# Patient Record
Sex: FEMALE | Race: OTHER
[De-identification: names, ages, dates, MRNs, and addresses within clinical notes are randomized per-mention and may not be internally consistent; named-entity substitution may affect disease eponyms.]

---

## 2023-01-11 ENCOUNTER — NON-APPOINTMENT (OUTPATIENT)
Age: 43
End: 2023-01-11

## 2023-01-17 ENCOUNTER — TRANSCRIPTION ENCOUNTER (OUTPATIENT)
Age: 43
End: 2023-01-17

## 2023-01-17 ENCOUNTER — APPOINTMENT (OUTPATIENT)
Dept: ORTHOPEDIC SURGERY | Facility: CLINIC | Age: 43
End: 2023-01-17
Payer: OTHER GOVERNMENT

## 2023-01-17 VITALS
WEIGHT: 210 LBS | BODY MASS INDEX: 31.83 KG/M2 | HEART RATE: 88 BPM | TEMPERATURE: 98.4 F | HEIGHT: 68 IN | DIASTOLIC BLOOD PRESSURE: 108 MMHG | OXYGEN SATURATION: 98 % | SYSTOLIC BLOOD PRESSURE: 163 MMHG

## 2023-01-17 DIAGNOSIS — Z83.3 FAMILY HISTORY OF DIABETES MELLITUS: ICD-10-CM

## 2023-01-17 DIAGNOSIS — Z96.651 PRESENCE OF RIGHT ARTIFICIAL KNEE JOINT: ICD-10-CM

## 2023-01-17 DIAGNOSIS — M25.561 PAIN IN RIGHT KNEE: ICD-10-CM

## 2023-01-17 DIAGNOSIS — Z00.00 ENCOUNTER FOR GENERAL ADULT MEDICAL EXAMINATION W/OUT ABNORMAL FINDINGS: ICD-10-CM

## 2023-01-17 PROCEDURE — 73564 X-RAY EXAM KNEE 4 OR MORE: CPT | Mod: RT

## 2023-01-17 PROCEDURE — 99204 OFFICE O/P NEW MOD 45 MIN: CPT

## 2023-01-17 PROCEDURE — 73560 X-RAY EXAM OF KNEE 1 OR 2: CPT | Mod: LT

## 2023-01-17 NOTE — DISCUSSION/SUMMARY
[de-identified] : At this time it is hard to evaluate where the patient's pain is coming from it appears it may be soft tissue at the patella tendon level where she contused her knee but is lasted for a month and there is some effusion in the knee for that reason she will get a [facility] MRI of the right knee to evaluate the soft tissues.  She now knows that her x-rays look good and if her pain resolves over the next several days she says she may not get the MRI but if it continues to hurt she will definitely get it and we will see her after that time.

## 2023-01-17 NOTE — HISTORY OF PRESENT ILLNESS
[de-identified] : Ms. CARLYLE GAMBINO is a 42 year female who presents to office complaining of right knee pain x 1 month.\par Patient had a partial right knee replacement in January 2022 then converted to a total right knee replacement in April 2022. \par These surgeries were done in [place] by a a surgeon there.\par About 1 month ago, she says her right knee got struck by a closing car door.\par She describes a constant throbbing pain localized to the lateral aspect of her knee where the car door hit.\par Pain is aggravated with increased weightbearing and ambulating.\par She reported doing very well with her right TKR until that point.\par She had rested, applied ice, took Advil, and elevated her leg following the injury.\par Denies radiation of pain or distal numbness/tingling.\par All review of systems, family history, social history, surgical history, past medical history, medications, and allergies not previously stated as positive are negative. They were reviewed by me today with the patient and documented accordingly.

## 2023-01-17 NOTE — PHYSICAL EXAM
[de-identified] : Constitutional - the patient is of normal build and overweight with a BMI of 31.9.  The patient remains oriented to person, time, and  place.  Mood is normal. Vital signs as recorded.  The patients gait is WNL. The patient has satisfactory  balance and can stand on toes and heels.\par \par The patient has no difficulty with respiration. Respiration at rest is a normal rate. The patient is not short of breath and has not become short of breath with short ambulation. There is no audible wheezing. No coughing.\par \par Skin is normal for the patient's age. There are no abnormal masses or lymph nodes which stand out in the lower extremities.\par \par Spine - deep tendon reflexes are symmetric. Motor and sensory are symmetric.\par \par \par UPPER EXTREMITIES \par \par Shoulders ROM  is symmetric  and the motion is satisfactory.  There is no significant shoulder pain or limitation in motion which would make using a cane or a walker difficult. Shoulder stability and  strength are satisfactory.\par \par There is normal motion in the wrists and elbows.\par \par Circulation appears satisfactory with pedal pulses present.  There is no major edema in the lower legs. No skin tenderness or increased temperature. No major varicosities.\par \par HIP EXAMINATION the abduction and abduction as well as rotation measurements were taken with the hip in flexion.\par \par Motion\par There is symmetric motion with flexion 135 degrees, abduction 80 degrees, adduction 30 degrees, external rotation 80 degrees, internal rotation 20 degrees.\par \par The hips have good range of motion. There is good strength across the hips. There is no crepitus in either hip. The alignment of the hips is normal.\par \par \par KNEE EXAMINATION\par \par Motion\par Right Knee has 0 to 130 degrees of motion with good medial  lateral and anterior posterior stability.  There is an effusion present which seem to happen after she hit her knee in the car door.  She does not recall if she had the swelling prior  There is no Baker's cyst.  There is no significant patellofemoral crepitus.  She has pain with palpation toward the medial aspect of her knee joint but more directly over the patella tendon where she appeared to have a contusion.  The patient has satisfactory strength across the knee.               \par Left  Knee   has 0 to 135 degrees of motion with good medial lateral and anterior posterior stability.  There is no major effusion there is no Baker's cyst.  There is no significant patellofemoral crepitus.  The patient has satisfactory strength across the knee.            \par \par Ankle and foot examination\par Of the ankle has normal motion.  There is normal ankle stability.  The patient has no major abnormalities of the foot.\par \par \par \par  [de-identified] : 4 views were taken of the patient's right knee with a AP of the left left knee shows normal medial lateral joint space and alignment of the knee no evidence of arthritis by x-ray.  Her right knee does show a cemented total knee replacement which shows excellent alignment of the leg it appears to be very well fixed there is no evidence of osteolysis and the patella tracks well on the skyline and lateral views.  There is no obvious evidence of fracture around the prosthesis by x-ray. No

## 2023-01-20 RX ORDER — DIAZEPAM 2 MG/1
2 TABLET ORAL DAILY
Qty: 1 | Refills: 0 | Status: ACTIVE | COMMUNITY
Start: 2023-01-20 | End: 1900-01-01

## 2023-01-28 ENCOUNTER — OUTPATIENT (OUTPATIENT)
Dept: OUTPATIENT SERVICES | Facility: HOSPITAL | Age: 43
LOS: 1 days | End: 2023-01-28
Payer: OTHER GOVERNMENT

## 2023-01-28 ENCOUNTER — APPOINTMENT (OUTPATIENT)
Dept: MRI IMAGING | Facility: CLINIC | Age: 43
End: 2023-01-28
Payer: OTHER GOVERNMENT

## 2023-01-28 DIAGNOSIS — Z96.651 PRESENCE OF RIGHT ARTIFICIAL KNEE JOINT: ICD-10-CM

## 2023-01-28 PROCEDURE — 73721 MRI JNT OF LWR EXTRE W/O DYE: CPT | Mod: 26,RT

## 2023-01-28 PROCEDURE — 73721 MRI JNT OF LWR EXTRE W/O DYE: CPT

## 2023-02-06 ENCOUNTER — LABORATORY RESULT (OUTPATIENT)
Age: 43
End: 2023-02-06

## 2023-02-06 ENCOUNTER — APPOINTMENT (OUTPATIENT)
Dept: ORTHOPEDIC SURGERY | Facility: CLINIC | Age: 43
End: 2023-02-06
Payer: OTHER GOVERNMENT

## 2023-02-06 VITALS
TEMPERATURE: 97.7 F | WEIGHT: 210 LBS | BODY MASS INDEX: 31.83 KG/M2 | OXYGEN SATURATION: 98 % | HEIGHT: 68 IN | HEART RATE: 74 BPM

## 2023-02-06 DIAGNOSIS — M25.469 EFFUSION, UNSPECIFIED KNEE: ICD-10-CM

## 2023-02-06 DIAGNOSIS — Z96.651 PRESENCE OF RIGHT ARTIFICIAL KNEE JOINT: ICD-10-CM

## 2023-02-06 PROCEDURE — 20610 DRAIN/INJ JOINT/BURSA W/O US: CPT

## 2023-02-06 PROCEDURE — 99214 OFFICE O/P EST MOD 30 MIN: CPT | Mod: 25

## 2023-02-06 NOTE — HISTORY OF PRESENT ILLNESS
[de-identified] : Ms. CARLYLE GAMBINO is a 42 year female who returns to office complaining of right knee pain x 2 months.\par Patient had a partial right knee replacement in January 2022 then converted to a total right knee replacement in April 2022. \par These surgeries were done in [place] by a a surgeon there.\par About 1 month ago, she says her right knee got struck by a closing car door.\par She describes a constant throbbing pain localized to the lateral aspect of her knee where the car door hit.\par Pain is aggravated with increased weightbearing and ambulating.\par She reported doing very well with her right TKR until that point.\par She had rested, applied ice, took Advil, and elevated her leg following the injury.\par Denies radiation of pain or distal numbness/tingling.\par She returns to office today for MRI right knee results review.

## 2023-02-06 NOTE — PROCEDURE
[de-identified] : Procedure Note:\par \par Anatomic Location:  Right  Knee\par \par Diagnosis: Effusion after total knee replacement\par \par Procedure: Aspiration for 10 cc of yellow fluid slightly cloudy and blood-tinged.\par \par Local Spray: Ethyl Chloride.\par \par \par Patient has consented for the procedure.\par \par Injection  through a lateral supra patella approach.\par \par Patient tolerated the procedure well.\par \par

## 2023-02-06 NOTE — DISCUSSION/SUMMARY
[de-identified] : This patient persistent having the discomfort her MRI has not shown her effusion is still present and appears to be more moderate.  Her motion has stayed the same but she does have the discomfort no fever or chills.  In order to better evaluate her orthopedic decision would be to do an aspiration of the knee which was done and 10 cc of yellow slightly cloudy fluid with blood-tinged was withdrawn.  She tolerated it well and will be sent for cell count look for crystals Gram stain culture and sensitivity.  She will also get a CBC sed rate and C-reactive protein.  I feel at this time it is the trauma that caused her need react as it has she has not had no fever or chills.  We will continue to evaluate her conservatively.

## 2023-02-06 NOTE — PHYSICAL EXAM
[de-identified] : \par \par \par KNEE EXAMINATION\par \par Motion\par Right Knee has 0 to 125 degrees of motion with good medial  lateral and anterior posterior stability.  There is an effusion present which seem to happen after she hit her knee in the car door.  She does not recall if she had the swelling prior  There is no Baker's cyst.  There is no significant patellofemoral crepitus.  She has pain with palpation toward the medial aspect of her knee joint but more directly over the patella tendon where she appeared to have a contusion.  The patient has satisfactory strength across the knee.               \par Left  Knee   has 0 to 135 degrees of motion with good medial lateral and anterior posterior stability.  There is no major effusion there is no Baker's cyst.  There is no significant patellofemoral crepitus.  The patient has satisfactory strength across the knee.            \par \par  [de-identified] : EXAM: [id number] - MR KNEE RT - ORDERED BY: SHERIDAN SETHI\par \par PROCEDURE DATE: 01/28/2023\par \par INTERPRETATION: History: Right knee pain after a car door hit patient's right knee and month ago. History of a total knee replacement converted from a partial right knee replacement performed in April 2022.\par \par Technique: Multiplanar and multisequence MRI images were obtained through the right knee without contrast utilizing metal reduction techniques.\par \par Comparison: [facility] knee radiographs 1/17/2023\par \par Findings:\par \par There has been a total right knee replacement with a cemented tibial component. No osteolysis or periprosthetic fracture. Fibula is intact. Moderate joint effusion with synovitis is present. Trace popliteal cyst. Quadriceps tendon is intact. Patellar tendon is intact. Mild edema is present about the iliotibial band, which is otherwise intact. Trace fluid in the deep infrapatellar bursa. Soft tissue edema in the anterior infrapatellar soft tissues. Susceptibility artifact is present in the soft tissues medial to the patella bone.\par \par IMPRESSION:\par 1. No periprosthetic fracture or osteolysis.\par 2. Moderate joint effusion with synovitis.\par 3. Mild edema in the infrapatellar anterior soft tissues.\par \par JESSICA MARTÍNEZ MD; Fellow Radiologist\par This document has been electronically signed.\par JORGE MARCELO MD; Attending Radiologist\par This document has been electronically signed. Jan 31 2023 2:11PM

## 2023-02-07 ENCOUNTER — NON-APPOINTMENT (OUTPATIENT)
Age: 43
End: 2023-02-07

## 2023-02-07 LAB
B PERT IGG+IGM PNL SER: ABNORMAL
COLOR FLD: YELLOW
EOSINOPHIL # FLD MANUAL: 1 %
FLUID INTAKE SUBSTANCE CLASS: NORMAL
LYMPHOCYTES # FLD MANUAL: 20 %
MESOTHL CELL NFR FLD: 0 %
MONOS+MACROS NFR FLD MANUAL: 71 %
NEUTS SEG # FLD MANUAL: 8 %
NRBC # FLD: 0 %
RBC # FLD MANUAL: 9000 /UL
SYCRY CLARITY: ABNORMAL
SYCRY COLOR: ABNORMAL
SYCRY ID: NORMAL
SYCRY TUBE: NORMAL
TOTAL CELLS COUNTED FLD: 251 /UL
TUBE TYPE: NORMAL
UNIDENT CELLS NFR FLD MANUAL: 0 %
VARIANT LYMPHS # FLD MANUAL: 0 %

## 2023-02-08 ENCOUNTER — NON-APPOINTMENT (OUTPATIENT)
Age: 43
End: 2023-02-08

## 2023-02-13 ENCOUNTER — NON-APPOINTMENT (OUTPATIENT)
Age: 43
End: 2023-02-13

## 2023-02-15 ENCOUNTER — LABORATORY RESULT (OUTPATIENT)
Age: 43
End: 2023-02-15

## 2023-02-17 ENCOUNTER — NON-APPOINTMENT (OUTPATIENT)
Age: 43
End: 2023-02-17

## 2023-02-21 ENCOUNTER — NON-APPOINTMENT (OUTPATIENT)
Age: 43
End: 2023-02-21